# Patient Record
Sex: FEMALE | Race: WHITE | NOT HISPANIC OR LATINO
[De-identification: names, ages, dates, MRNs, and addresses within clinical notes are randomized per-mention and may not be internally consistent; named-entity substitution may affect disease eponyms.]

---

## 2017-06-21 ENCOUNTER — APPOINTMENT (OUTPATIENT)
Dept: OBGYN | Facility: CLINIC | Age: 57
End: 2017-06-21

## 2017-06-21 VITALS
DIASTOLIC BLOOD PRESSURE: 59 MMHG | HEIGHT: 64 IN | BODY MASS INDEX: 31.58 KG/M2 | SYSTOLIC BLOOD PRESSURE: 88 MMHG | WEIGHT: 185 LBS

## 2017-06-21 DIAGNOSIS — C18.9 MALIGNANT NEOPLASM OF COLON, UNSPECIFIED: ICD-10-CM

## 2017-06-26 LAB — HPV HIGH+LOW RISK DNA PNL CVX: NEGATIVE

## 2017-06-27 LAB — CYTOLOGY CVX/VAG DOC THIN PREP: NORMAL

## 2018-10-24 ENCOUNTER — APPOINTMENT (OUTPATIENT)
Dept: OBGYN | Facility: CLINIC | Age: 58
End: 2018-10-24
Payer: COMMERCIAL

## 2018-10-24 PROCEDURE — 99396 PREV VISIT EST AGE 40-64: CPT

## 2018-10-24 RX ORDER — CALCIUM CARBONATE 600 MG
TABLET ORAL
Refills: 0 | Status: ACTIVE | COMMUNITY

## 2018-10-29 LAB
CYTOLOGY CVX/VAG DOC THIN PREP: NORMAL
HPV HIGH+LOW RISK DNA PNL CVX: NOT DETECTED

## 2020-11-19 ENCOUNTER — APPOINTMENT (OUTPATIENT)
Dept: OBGYN | Facility: CLINIC | Age: 60
End: 2020-11-19
Payer: COMMERCIAL

## 2020-11-19 VITALS
BODY MASS INDEX: 32.44 KG/M2 | HEIGHT: 64 IN | SYSTOLIC BLOOD PRESSURE: 103 MMHG | WEIGHT: 190 LBS | DIASTOLIC BLOOD PRESSURE: 70 MMHG

## 2020-11-19 PROCEDURE — 99396 PREV VISIT EST AGE 40-64: CPT

## 2020-11-19 PROCEDURE — 82270 OCCULT BLOOD FECES: CPT

## 2020-11-19 NOTE — HISTORY OF PRESENT ILLNESS
[FreeTextEntry1] : 60 year-old patient for gynecological exams without any complaints [Patient reported mammogram was normal] : Patient reported mammogram was normal [Patient reported PAP Smear was normal] : Patient reported PAP Smear was normal [Patient reported bone density results were normal] : Patient reported bone density results were normal [Mammogramdate] : 2019 [PapSmeardate] : 2018 [BoneDensityDate] : 2015 [Currently In Menopause] : currently in menopause [Menopause Age: ____] : age at menopause was [unfilled]

## 2020-11-23 LAB
CYTOLOGY CVX/VAG DOC THIN PREP: NORMAL
HPV HIGH+LOW RISK DNA PNL CVX: NOT DETECTED

## 2021-12-10 ENCOUNTER — NON-APPOINTMENT (OUTPATIENT)
Age: 61
End: 2021-12-10

## 2021-12-11 ENCOUNTER — APPOINTMENT (OUTPATIENT)
Dept: ORTHOPEDIC SURGERY | Facility: CLINIC | Age: 61
End: 2021-12-11
Payer: COMMERCIAL

## 2021-12-11 VITALS
HEIGHT: 64 IN | HEART RATE: 90 BPM | SYSTOLIC BLOOD PRESSURE: 107 MMHG | WEIGHT: 193 LBS | BODY MASS INDEX: 32.95 KG/M2 | DIASTOLIC BLOOD PRESSURE: 75 MMHG

## 2021-12-11 DIAGNOSIS — M70.62 TROCHANTERIC BURSITIS, LEFT HIP: ICD-10-CM

## 2021-12-11 DIAGNOSIS — M25.552 PAIN IN LEFT HIP: ICD-10-CM

## 2021-12-11 PROCEDURE — 99203 OFFICE O/P NEW LOW 30 MIN: CPT | Mod: NC

## 2021-12-11 PROCEDURE — 73502 X-RAY EXAM HIP UNI 2-3 VIEWS: CPT

## 2021-12-11 NOTE — HISTORY OF PRESENT ILLNESS
[de-identified] : Ms. EDD LION is a 61 year female who presents to office complaining of left hip pain x 3 weeks with insidious onset.\par Denies specific injury, trauma, or fall.\par Pain is described as a constant aching pain on the lateral aspect of her hip radiating down the lateral aspect of her thigh to her knee.\par Pain is usually aggravated with lying down on the affected hip, going down stairs, and walking long periods of time.\par She has tried Tylenol PRN for the pain as well as rest, which has helped somewhat.\par She denies groin pain, lower back pain, radicular pain, or numbness/tingling in her legs at this time.\par All review of systems, family history, social history, surgical history, past medical history, medications, and allergies not previously stated as positive are negative. They were reviewed by me today with the patient and documented accordingly.

## 2021-12-11 NOTE — DISCUSSION/SUMMARY
[de-identified] : OTC NSAIDs and Tylenol PRN pain\par Educated on application of ice to hip 20 mins 2-3 times/day\par Follow up with Dr. Stephenson PRN\par Consider cortisone injection at that time\par All options discussed with patient\par All questions by patient answered to their satisfaction\par Patient expresses full understanding and agreement with plan\par Patient is happy with the office visit\par

## 2021-12-11 NOTE — PHYSICAL EXAM
[de-identified] : Left Hip/Pelvis: No obvious deformities, atrophy, ecchymosis, or swelling/effusion. Equivalent leg lengths noted. Positive tenderness to greater trochanteric bursa. Negative tenderness to sacroiliac joint, proximal flexor tendon, and groin. Normal active and passive range of motion, including flexion to 125 degrees, internal/external rotation to 60 degrees, and abduction/adduction to 60 degrees. Negative JUANCARLOS, FADIR, and Impingement tests. Neurovascular status is intact.\par \par Otherwise, no apparent distress. Alert and oriented x 3. Normal mood and affect. No atrophy or swelling. 5 out of 5 motor strength. Sensation is intact and symmetrical. Normal deep tendon reflexes. Full range of motion of shoulder, elbows, hips, and knees (flexion, extension, and rotation). No palpatory tenderness or palpable lymph nodes. Negative straight leg raise. Normal finger-to-nose test. No pathological reflexes. Normal ambulation. No upper or lower extremity instability. [de-identified] : 2 views of left hip and AP pelvis are negative for any obvious fractures or dislocations. No extensive degenerative changes are noted. The patient understands that this is a wet read and I am not a radiologist. If the final read reveals something different than discussed in the office, then the patient will get a call back in the next 24 - 48 hours to discuss.\par

## 2021-12-13 PROBLEM — M70.62 GREATER TROCHANTERIC BURSITIS OF LEFT HIP: Status: ACTIVE | Noted: 2021-12-11

## 2021-12-13 PROBLEM — M25.552 LEFT HIP PAIN: Status: ACTIVE | Noted: 2021-12-10

## 2022-03-07 ENCOUNTER — APPOINTMENT (OUTPATIENT)
Dept: OBGYN | Facility: CLINIC | Age: 62
End: 2022-03-07
Payer: COMMERCIAL

## 2022-03-07 VITALS
BODY MASS INDEX: 34.91 KG/M2 | SYSTOLIC BLOOD PRESSURE: 102 MMHG | HEIGHT: 63 IN | WEIGHT: 197 LBS | DIASTOLIC BLOOD PRESSURE: 70 MMHG

## 2022-03-07 PROCEDURE — 99396 PREV VISIT EST AGE 40-64: CPT

## 2022-03-07 NOTE — PHYSICAL EXAM
[Appropriately responsive] : appropriately responsive [Alert] : alert [No Acute Distress] : no acute distress [No Lymphadenopathy] : no lymphadenopathy [Regular Rate Rhythm] : regular rate rhythm [No Murmurs] : no murmurs [Clear to Auscultation B/L] : clear to auscultation bilaterally [Soft] : soft [Non-tender] : non-tender [Non-distended] : non-distended [No HSM] : No HSM [No Lesions] : no lesions [No Mass] : no mass [Oriented x3] : oriented x3 [Examination Of The Breasts] : a normal appearance [No Masses] : no breast masses were palpable [Labia Majora] : normal [Labia Minora] : normal [Cystocele] : a cystocele [Rectocele] : a rectocele [Uterine Prolapse] : uterine prolapse [Normal] : normal [Uterine Adnexae] : normal

## 2022-03-07 NOTE — HISTORY OF PRESENT ILLNESS
[FreeTextEntry1] : 61 year old patient for gynecological exam .\par Denies history of bleeding or staining. [Mammogramdate] : 2020 [PapSmeardate] : 2020 [BoneDensityDate] : 2021 [ColonoscopyDate] : 2015 [Currently In Menopause] : currently in menopause [Menopause Age: ____] : age at menopause was [unfilled]

## 2022-03-07 NOTE — DISCUSSION/SUMMARY
[FreeTextEntry1] : 61-year-old patient with asymptomatic mild prolapse.\par Examination Pap smear was done patient was given prescription for mammogram and sonogram

## 2022-03-09 LAB — HPV HIGH+LOW RISK DNA PNL CVX: NOT DETECTED

## 2022-03-13 LAB — CYTOLOGY CVX/VAG DOC THIN PREP: NORMAL

## 2023-02-27 ENCOUNTER — APPOINTMENT (OUTPATIENT)
Dept: OBGYN | Facility: CLINIC | Age: 63
End: 2023-02-27
Payer: COMMERCIAL

## 2023-02-27 DIAGNOSIS — N81.4 UTEROVAGINAL PROLAPSE, UNSPECIFIED: ICD-10-CM

## 2023-02-27 PROCEDURE — 99396 PREV VISIT EST AGE 40-64: CPT

## 2023-02-27 NOTE — HISTORY OF PRESENT ILLNESS
[FreeTextEntry1] : 62-year-old patient for gynecological exam without any gynecological complaints,\par History of colon cancer. [Mammogramdate] : 2022 [PapSmeardate] : 2022 [BoneDensityDate] : 2015 [ColonoscopyDate] : 2021 [Currently In Menopause] : currently in menopause [Menopause Age: ____] : age at menopause was [unfilled]

## 2023-02-27 NOTE — PLAN
[FreeTextEntry1] : Examination Pap smear was done.  Patient recommended to go for mammogram and sonogram.

## 2023-02-27 NOTE — PHYSICAL EXAM
[Appropriately responsive] : appropriately responsive [Alert] : alert [No Acute Distress] : no acute distress [No Lymphadenopathy] : no lymphadenopathy [Regular Rate Rhythm] : regular rate rhythm [No Murmurs] : no murmurs [Clear to Auscultation B/L] : clear to auscultation bilaterally [Soft] : soft [Non-tender] : non-tender [Non-distended] : non-distended [No HSM] : No HSM [No Lesions] : no lesions [No Mass] : no mass [Oriented x3] : oriented x3 [Examination Of The Breasts] : a normal appearance [No Masses] : no breast masses were palpable [Labia Majora] : normal [Labia Minora] : normal [Urethrocele] : urethrocele [Cystocele] : a cystocele [Normal] : normal [Uterine Adnexae] : normal Patient

## 2023-02-28 LAB — HPV HIGH+LOW RISK DNA PNL CVX: NOT DETECTED

## 2023-03-01 LAB — CYTOLOGY CVX/VAG DOC THIN PREP: NORMAL

## 2023-03-27 ENCOUNTER — NON-APPOINTMENT (OUTPATIENT)
Age: 63
End: 2023-03-27

## 2023-09-22 ENCOUNTER — APPOINTMENT (OUTPATIENT)
Age: 63
End: 2023-09-22

## 2023-09-22 ENCOUNTER — APPOINTMENT (OUTPATIENT)
Dept: FAMILY MEDICINE | Facility: CLINIC | Age: 63
End: 2023-09-22
Payer: COMMERCIAL

## 2023-09-22 VITALS
TEMPERATURE: 98.5 F | HEART RATE: 83 BPM | DIASTOLIC BLOOD PRESSURE: 75 MMHG | WEIGHT: 190 LBS | RESPIRATION RATE: 18 BRPM | HEIGHT: 64 IN | SYSTOLIC BLOOD PRESSURE: 112 MMHG | BODY MASS INDEX: 32.44 KG/M2 | OXYGEN SATURATION: 98 %

## 2023-09-22 DIAGNOSIS — Z95.810 PRESENCE OF AUTOMATIC (IMPLANTABLE) CARDIAC DEFIBRILLATOR: ICD-10-CM

## 2023-09-22 DIAGNOSIS — R60.9 EDEMA, UNSPECIFIED: ICD-10-CM

## 2023-09-22 DIAGNOSIS — I48.0 PAROXYSMAL ATRIAL FIBRILLATION: ICD-10-CM

## 2023-09-22 DIAGNOSIS — I42.8 OTHER CARDIOMYOPATHIES: ICD-10-CM

## 2023-09-22 DIAGNOSIS — Q26.1 PERSISTENT LEFT SUPERIOR VENA CAVA: ICD-10-CM

## 2023-09-22 DIAGNOSIS — R06.02 SHORTNESS OF BREATH: ICD-10-CM

## 2023-09-22 DIAGNOSIS — E03.9 HYPOTHYROIDISM, UNSPECIFIED: ICD-10-CM

## 2023-09-22 DIAGNOSIS — I50.22 CHRONIC SYSTOLIC (CONGESTIVE) HEART FAILURE: ICD-10-CM

## 2023-09-22 DIAGNOSIS — I44.7 LEFT BUNDLE-BRANCH BLOCK, UNSPECIFIED: ICD-10-CM

## 2023-09-22 DIAGNOSIS — I51.89 OTHER ILL-DEFINED HEART DISEASES: ICD-10-CM

## 2023-09-22 DIAGNOSIS — I10 ESSENTIAL (PRIMARY) HYPERTENSION: ICD-10-CM

## 2023-09-22 DIAGNOSIS — R60.0 LOCALIZED EDEMA: ICD-10-CM

## 2023-09-22 PROCEDURE — 99204 OFFICE O/P NEW MOD 45 MIN: CPT

## 2023-09-22 RX ORDER — POTASSIUM 75 MG
TABLET ORAL
Refills: 0 | Status: DISCONTINUED | COMMUNITY
End: 2023-09-22

## 2023-09-22 RX ORDER — AMOXICILLIN AND CLAVULANATE POTASSIUM 875; 125 MG/1; MG/1
875-125 TABLET, COATED ORAL TWICE DAILY
Qty: 14 | Refills: 0 | Status: ACTIVE | COMMUNITY
Start: 2023-09-22 | End: 1900-01-01

## 2023-09-22 RX ORDER — SOTALOL HYDROCHLORIDE 80 MG/1
80 TABLET ORAL
Refills: 0 | Status: ACTIVE | COMMUNITY

## 2023-09-22 RX ORDER — LEVOTHYROXINE SODIUM 100 UG/1
100 TABLET ORAL
Refills: 0 | Status: ACTIVE | COMMUNITY

## 2023-09-22 RX ORDER — SACUBITRIL AND VALSARTAN 24; 26 MG/1; MG/1
24-26 TABLET, FILM COATED ORAL
Refills: 0 | Status: ACTIVE | COMMUNITY

## 2023-09-22 RX ORDER — CHROMIUM 200 MCG
25 MCG TABLET ORAL
Refills: 0 | Status: ACTIVE | COMMUNITY

## 2023-09-22 RX ORDER — DAPAGLIFLOZIN 10 MG/1
10 TABLET, FILM COATED ORAL
Refills: 0 | Status: ACTIVE | COMMUNITY

## 2023-09-22 RX ORDER — TORSEMIDE 20 MG/1
20 TABLET ORAL
Refills: 0 | Status: ACTIVE | COMMUNITY

## 2023-09-22 RX ORDER — POTASSIUM CHLORIDE 1500 MG/1
20 TABLET, EXTENDED RELEASE ORAL
Refills: 0 | Status: ACTIVE | COMMUNITY

## 2023-09-22 RX ORDER — RIVAROXABAN 20 MG/1
20 TABLET, FILM COATED ORAL
Refills: 0 | Status: ACTIVE | COMMUNITY

## 2023-09-26 ENCOUNTER — APPOINTMENT (OUTPATIENT)
Dept: OTOLARYNGOLOGY | Facility: CLINIC | Age: 63
End: 2023-09-26

## 2024-03-04 ENCOUNTER — APPOINTMENT (OUTPATIENT)
Dept: OBGYN | Facility: CLINIC | Age: 64
End: 2024-03-04
Payer: COMMERCIAL

## 2024-03-04 VITALS
SYSTOLIC BLOOD PRESSURE: 117 MMHG | HEIGHT: 64 IN | BODY MASS INDEX: 32.44 KG/M2 | WEIGHT: 190 LBS | DIASTOLIC BLOOD PRESSURE: 78 MMHG

## 2024-03-04 DIAGNOSIS — Z01.419 ENCOUNTER FOR GYNECOLOGICAL EXAMINATION (GENERAL) (ROUTINE) W/OUT ABNORMAL FINDINGS: ICD-10-CM

## 2024-03-04 PROCEDURE — 99396 PREV VISIT EST AGE 40-64: CPT

## 2024-03-04 NOTE — HISTORY OF PRESENT ILLNESS
[FreeTextEntry1] : 63-year-old patient for gynecological exam without any gynecological complaints [Mammogramdate] : 2023 [PapSmeardate] : 2023 [BoneDensityDate] : 2023 [Currently In Menopause] : currently in menopause [ColonoscopyDate] : 2020 [Menopause Age: ____] : age at menopause was [unfilled]

## 2025-05-29 ENCOUNTER — APPOINTMENT (OUTPATIENT)
Dept: OBGYN | Facility: CLINIC | Age: 65
End: 2025-05-29
Payer: COMMERCIAL

## 2025-05-29 VITALS
DIASTOLIC BLOOD PRESSURE: 81 MMHG | WEIGHT: 195 LBS | SYSTOLIC BLOOD PRESSURE: 120 MMHG | HEIGHT: 64 IN | BODY MASS INDEX: 33.29 KG/M2

## 2025-05-29 DIAGNOSIS — N81.4 UTEROVAGINAL PROLAPSE, UNSPECIFIED: ICD-10-CM

## 2025-05-29 DIAGNOSIS — Z01.419 ENCOUNTER FOR GYNECOLOGICAL EXAMINATION (GENERAL) (ROUTINE) W/OUT ABNORMAL FINDINGS: ICD-10-CM

## 2025-05-29 PROCEDURE — 99396 PREV VISIT EST AGE 40-64: CPT

## 2025-06-02 LAB
CYTOLOGY CVX/VAG DOC THIN PREP: NORMAL
HPV HIGH+LOW RISK DNA PNL CVX: NOT DETECTED